# Patient Record
Sex: FEMALE | Race: WHITE | NOT HISPANIC OR LATINO | Employment: PART TIME | URBAN - METROPOLITAN AREA
[De-identification: names, ages, dates, MRNs, and addresses within clinical notes are randomized per-mention and may not be internally consistent; named-entity substitution may affect disease eponyms.]

---

## 2019-07-17 ENCOUNTER — HOSPITAL ENCOUNTER (OUTPATIENT)
Dept: OTHER | Facility: HOSPITAL | Age: 39
Discharge: HOME | End: 2019-07-17

## 2019-07-17 DIAGNOSIS — Z00.6 EXAMINATION FOR NORMAL COMPARISON OR CONTROL IN CLINICAL RESEARCH: ICD-10-CM

## 2019-07-24 ENCOUNTER — HOSPITAL ENCOUNTER (OUTPATIENT)
Dept: ULTRASOUND IMAGING | Facility: HOSPITAL | Age: 39
Discharge: HOME | End: 2019-07-24
Admitting: INTERNAL MEDICINE

## 2019-07-24 DIAGNOSIS — E04.1 THYROID NODULE: ICD-10-CM

## 2019-07-24 PROCEDURE — 88305 TISSUE EXAM BY PATHOLOGIST: CPT | Performed by: INTERNAL MEDICINE

## 2019-07-24 PROCEDURE — 25010000003 LIDOCAINE 1 % SOLUTION: Performed by: INTERNAL MEDICINE

## 2019-07-24 PROCEDURE — 88173 CYTOPATH EVAL FNA REPORT: CPT | Performed by: INTERNAL MEDICINE

## 2019-07-24 RX ORDER — LIDOCAINE HYDROCHLORIDE 10 MG/ML
5 INJECTION, SOLUTION INFILTRATION; PERINEURAL ONCE
Status: COMPLETED | OUTPATIENT
Start: 2019-07-24 | End: 2019-07-24

## 2019-07-24 RX ADMIN — LIDOCAINE HYDROCHLORIDE 5 ML: 10 INJECTION, SOLUTION INFILTRATION; PERINEURAL at 11:45

## 2019-07-26 LAB
LAB AP CASE REPORT: NORMAL
PATH REPORT.ADDENDUM SPEC: NORMAL
PATH REPORT.FINAL DX SPEC: NORMAL
PATH REPORT.GROSS SPEC: NORMAL

## 2021-05-24 ENCOUNTER — OFFICE VISIT (OUTPATIENT)
Dept: FAMILY MEDICINE CLINIC | Facility: CLINIC | Age: 41
End: 2021-05-24

## 2021-05-24 VITALS
BODY MASS INDEX: 30.55 KG/M2 | RESPIRATION RATE: 18 BRPM | WEIGHT: 172.4 LBS | DIASTOLIC BLOOD PRESSURE: 90 MMHG | HEART RATE: 108 BPM | HEIGHT: 63 IN | TEMPERATURE: 97.9 F | SYSTOLIC BLOOD PRESSURE: 152 MMHG | OXYGEN SATURATION: 97 %

## 2021-05-24 DIAGNOSIS — R53.83 MALAISE AND FATIGUE: ICD-10-CM

## 2021-05-24 DIAGNOSIS — E11.43 TYPE 2 DIABETES MELLITUS WITH DIABETIC AUTONOMIC NEUROPATHY, WITHOUT LONG-TERM CURRENT USE OF INSULIN (HCC): Primary | ICD-10-CM

## 2021-05-24 DIAGNOSIS — E89.0 POSTABLATIVE HYPOTHYROIDISM: ICD-10-CM

## 2021-05-24 DIAGNOSIS — R53.81 MALAISE AND FATIGUE: ICD-10-CM

## 2021-05-24 DIAGNOSIS — I10 ESSENTIAL HYPERTENSION: ICD-10-CM

## 2021-05-24 PROBLEM — E07.9 THYROID DISORDER: Status: ACTIVE | Noted: 2019-04-30

## 2021-05-24 PROCEDURE — 99204 OFFICE O/P NEW MOD 45 MIN: CPT | Performed by: FAMILY MEDICINE

## 2021-05-24 RX ORDER — AMLODIPINE BESYLATE 5 MG/1
5 TABLET ORAL DAILY
COMMUNITY
Start: 2021-05-05

## 2021-05-24 RX ORDER — METFORMIN HYDROCHLORIDE 500 MG/1
TABLET, EXTENDED RELEASE ORAL
COMMUNITY
Start: 2021-05-22

## 2021-05-24 RX ORDER — HYDROCHLOROTHIAZIDE 12.5 MG/1
TABLET ORAL
COMMUNITY
Start: 2021-05-15

## 2021-05-24 RX ORDER — LEVOTHYROXINE SODIUM 100 UG/1
TABLET ORAL
COMMUNITY
Start: 2021-05-15

## 2021-05-24 RX ORDER — PROGESTERONE 100 MG/1
100 INSERT VAGINAL DAILY
Qty: 21 EACH | Refills: 4 | Status: SHIPPED | OUTPATIENT
Start: 2021-05-24 | End: 2021-05-26 | Stop reason: SDUPTHER

## 2021-05-24 NOTE — PROGRESS NOTES
Subjective   Debbie Deluca is a 40 y.o. female.   Chief Complaint   Patient presents with   • Hypertension     Hypertension  This is a chronic problem. The current episode started more than 1 year ago. The problem is controlled. Pertinent negatives include no chest pain or shortness of breath. Risk factors for coronary artery disease include diabetes mellitus. There are no compliance problems.         The following portions of the patient's history were reviewed and updated as appropriate: allergies, current medications, past family history, past medical history, past social history, past surgical history and problem list.    Past Medical History:   Diagnosis Date   • Diabetes mellitus (CMS/HCC)    • Hypertension    • Hypothyroidism        Past Surgical History:   Procedure Laterality Date   • THYROIDECTOMY  2018   • US GUIDED FINE NEEDLE ASPIRATION  7/24/2019        Family History   Problem Relation Age of Onset   • Cancer Mother         dx at age late 68, living at 70   • Miscarriages / Stillbirths Mother    • Other Sister         Downd Syndrom   • Other Sister         MS-dx at age 43        Social History     Socioeconomic History   • Marital status:      Spouse name: Not on file   • Number of children: Not on file   • Years of education: Not on file   • Highest education level: Not on file   Tobacco Use   • Smoking status: Never Smoker   • Smokeless tobacco: Never Used   Substance and Sexual Activity   • Alcohol use: Never   • Drug use: Never   • Sexual activity: Yes     Partners: Male     Birth control/protection: None         Review of Systems   Constitutional: Negative for activity change, appetite change and fatigue.   HENT: Negative for ear discharge, ear pain, postnasal drip and rhinorrhea.    Eyes: Negative for double vision and discharge.   Respiratory: Negative for cough, chest tightness and shortness of breath.    Cardiovascular: Negative for chest pain.   Endocrine: Negative for cold  "intolerance and heat intolerance.   Musculoskeletal: Negative for back pain, gait problem and joint swelling.   Skin: Negative for color change and rash.   Neurological: Negative for dizziness, facial asymmetry and confusion.   Psychiatric/Behavioral: Negative for behavioral problems.       Objective   Visit Vitals  /90 (BP Location: Left arm, Patient Position: Sitting, Cuff Size: Adult)   Pulse 108   Temp 97.9 °F (36.6 °C) (Temporal)   Resp 18   Ht 160 cm (63\")   Wt 78.2 kg (172 lb 6.4 oz)   SpO2 97%   BMI 30.54 kg/m²     Physical Exam  Vitals reviewed.   Constitutional:       Appearance: She is well-developed.   HENT:      Head: Normocephalic.      Right Ear: External ear normal.      Left Ear: External ear normal.      Nose: Nose normal.   Eyes:      Conjunctiva/sclera: Conjunctivae normal.   Cardiovascular:      Rate and Rhythm: Normal rate and regular rhythm.   Pulmonary:      Effort: Pulmonary effort is normal.      Breath sounds: Normal breath sounds.   Musculoskeletal:         General: Normal range of motion.      Cervical back: Normal range of motion and neck supple.   Skin:     General: Skin is warm and dry.      Capillary Refill: Capillary refill takes less than 2 seconds.   Neurological:      Mental Status: She is alert and oriented to person, place, and time.         Assessment/Plan   Problem List Items Addressed This Visit        Cardiac and Vasculature    Essential hypertension    Current Assessment & Plan     Hypertension is worsening.  Continue current treatment regimen.  Dietary sodium restriction.  Weight loss.  Regular aerobic exercise.  Blood pressure will be reassessed in 3 months.         Relevant Medications    amLODIPine (NORVASC) 5 MG tablet    hydroCHLOROthiazide (HYDRODIURIL) 12.5 MG tablet    Other Relevant Orders    Comprehensive Metabolic Panel (Completed)    Lipid Panel With / Chol / HDL Ratio (Completed)    CBC & Differential (Completed)       Endocrine and Metabolic    Type 2 " diabetes mellitus with diabetic autonomic neuropathy, without long-term current use of insulin (CMS/[facility]) - Primary    Current Assessment & Plan     Diabetes is improving with treatment.   Continue current treatment regimen.  Diabetes will be reassessed in 3 months.         Relevant Medications    metFORMIN ER (GLUCOPHAGE-XR) 500 MG 24 hr tablet    Other Relevant Orders    Hemoglobin A1c (Completed)    Postablative hypothyroidism    Current Assessment & Plan     Blood work obtained.  Will continue to monitor symptoms         Relevant Medications    Euthyrox 100 MCG tablet    Other Relevant Orders    TSH (Completed)    T3 (Completed)    T3, Free (Completed)    T4 (Completed)    T4, Free (Completed)      Other Visit Diagnoses     Malaise and fatigue        Relevant Orders    Vitamin D 25 Hydroxy (Completed)    Vitamin B12 (Completed)

## 2021-05-25 LAB
25(OH)D3+25(OH)D2 SERPL-MCNC: 42.4 NG/ML (ref 30–100)
ALBUMIN SERPL-MCNC: 4.9 G/DL (ref 3.8–4.8)
ALBUMIN/GLOB SERPL: 1.5 {RATIO} (ref 1.2–2.2)
ALP SERPL-CCNC: 58 IU/L (ref 48–121)
ALT SERPL-CCNC: 115 IU/L (ref 0–32)
AST SERPL-CCNC: 59 IU/L (ref 0–40)
BASOPHILS # BLD AUTO: 0.1 X10E3/UL (ref 0–0.2)
BASOPHILS NFR BLD AUTO: 1 %
BILIRUB SERPL-MCNC: 0.6 MG/DL (ref 0–1.2)
BUN SERPL-MCNC: 12 MG/DL (ref 6–20)
BUN/CREAT SERPL: 15 (ref 9–23)
CALCIUM SERPL-MCNC: 9.8 MG/DL (ref 8.7–10.2)
CHLORIDE SERPL-SCNC: 96 MMOL/L (ref 96–106)
CHOLEST SERPL-MCNC: 236 MG/DL (ref 100–199)
CHOLEST/HDLC SERPL: 5.6 RATIO (ref 0–4.4)
CO2 SERPL-SCNC: 25 MMOL/L (ref 20–29)
CREAT SERPL-MCNC: 0.81 MG/DL (ref 0.57–1)
EOSINOPHIL # BLD AUTO: 0.1 X10E3/UL (ref 0–0.4)
EOSINOPHIL NFR BLD AUTO: 1 %
ERYTHROCYTE [DISTWIDTH] IN BLOOD BY AUTOMATED COUNT: 12.8 % (ref 11.7–15.4)
GLOBULIN SER CALC-MCNC: 3.3 G/DL (ref 1.5–4.5)
GLUCOSE SERPL-MCNC: 85 MG/DL (ref 65–99)
HBA1C MFR BLD: 5.6 % (ref 4.8–5.6)
HCT VFR BLD AUTO: 44.3 % (ref 34–46.6)
HDLC SERPL-MCNC: 42 MG/DL
HGB BLD-MCNC: 14.6 G/DL (ref 11.1–15.9)
IMM GRANULOCYTES # BLD AUTO: 0 X10E3/UL (ref 0–0.1)
IMM GRANULOCYTES NFR BLD AUTO: 0 %
LDLC SERPL CALC-MCNC: 154 MG/DL (ref 0–99)
LYMPHOCYTES # BLD AUTO: 2.1 X10E3/UL (ref 0.7–3.1)
LYMPHOCYTES NFR BLD AUTO: 21 %
MCH RBC QN AUTO: 29.3 PG (ref 26.6–33)
MCHC RBC AUTO-ENTMCNC: 33 G/DL (ref 31.5–35.7)
MCV RBC AUTO: 89 FL (ref 79–97)
MONOCYTES # BLD AUTO: 0.6 X10E3/UL (ref 0.1–0.9)
MONOCYTES NFR BLD AUTO: 7 %
NEUTROPHILS # BLD AUTO: 6.9 X10E3/UL (ref 1.4–7)
NEUTROPHILS NFR BLD AUTO: 70 %
PLATELET # BLD AUTO: 393 X10E3/UL (ref 150–450)
POTASSIUM SERPL-SCNC: 4.3 MMOL/L (ref 3.5–5.2)
PROT SERPL-MCNC: 8.2 G/DL (ref 6–8.5)
RBC # BLD AUTO: 4.99 X10E6/UL (ref 3.77–5.28)
SODIUM SERPL-SCNC: 139 MMOL/L (ref 134–144)
T3 SERPL-MCNC: 104 NG/DL (ref 71–180)
T3FREE SERPL-MCNC: 3 PG/ML (ref 2–4.4)
T4 FREE SERPL-MCNC: 1.86 NG/DL (ref 0.82–1.77)
T4 SERPL-MCNC: 11.3 UG/DL (ref 4.5–12)
TRIGL SERPL-MCNC: 217 MG/DL (ref 0–149)
TSH SERPL DL<=0.005 MIU/L-ACNC: 2.05 UIU/ML (ref 0.45–4.5)
VIT B12 SERPL-MCNC: 442 PG/ML (ref 232–1245)
VLDLC SERPL CALC-MCNC: 40 MG/DL (ref 5–40)
WBC # BLD AUTO: 9.9 X10E3/UL (ref 3.4–10.8)

## 2021-05-26 RX ORDER — PROGESTERONE 100 MG/1
100 INSERT VAGINAL DAILY
Qty: 21 EACH | Refills: 4 | Status: SHIPPED | OUTPATIENT
Start: 2021-05-26 | End: 2021-06-07

## 2021-05-26 NOTE — TELEPHONE ENCOUNTER
Received a Note from pharmacy that this was [facility] ( as it was [facility] to [facility] as patient wanted it but it was $328.03 ) but due to their insurance it is needing to be sent there.   Refill request being sent to PCP [facility]ill [facility]

## 2021-05-27 PROBLEM — E89.0 POSTABLATIVE HYPOTHYROIDISM: Status: ACTIVE | Noted: 2021-05-27

## 2021-05-27 PROBLEM — E11.43 TYPE 2 DIABETES MELLITUS WITH DIABETIC AUTONOMIC NEUROPATHY, WITHOUT LONG-TERM CURRENT USE OF INSULIN (HCC): Status: ACTIVE | Noted: 2021-05-27

## 2021-06-07 ENCOUNTER — OFFICE VISIT (OUTPATIENT)
Dept: FAMILY MEDICINE CLINIC | Facility: CLINIC | Age: 41
End: 2021-06-07

## 2021-06-07 VITALS
DIASTOLIC BLOOD PRESSURE: 91 MMHG | TEMPERATURE: 97.5 F | SYSTOLIC BLOOD PRESSURE: 159 MMHG | RESPIRATION RATE: 18 BRPM | HEART RATE: 116 BPM | OXYGEN SATURATION: 98 %

## 2021-06-07 DIAGNOSIS — N92.6 ABNORMAL MENSTRUAL CYCLE: ICD-10-CM

## 2021-06-07 DIAGNOSIS — E07.9 THYROID DISORDER: ICD-10-CM

## 2021-06-07 DIAGNOSIS — E78.2 MIXED HYPERLIPIDEMIA: Primary | ICD-10-CM

## 2021-06-07 PROCEDURE — 99214 OFFICE O/P EST MOD 30 MIN: CPT | Performed by: FAMILY MEDICINE

## 2021-06-07 RX ORDER — PROGESTERONE 200 MG/1
200 CAPSULE ORAL DAILY
Qty: 20 CAPSULE | Refills: 3 | Status: SHIPPED | OUTPATIENT
Start: 2021-06-07

## 2021-06-07 NOTE — PROGRESS NOTES
Chief Complaint  Labs Only and Hyperlipidemia    Subjective    History of Present Illness        Debbie Deluca presents to [facility] for   Hyperlipidemia  This is a recurrent problem. The current episode started more than 1 year ago. The problem is uncontrolled. Recent lipid tests were reviewed and are high. Exacerbating diseases include hypothyroidism and obesity. She has no history of chronic renal disease, diabetes, liver disease or nephrotic syndrome. There are no known factors aggravating her hyperlipidemia. Pertinent negatives include no chest pain, focal sensory loss, focal weakness, leg pain, myalgias or shortness of breath. Current antihyperlipidemic treatment includes statins. The current treatment provides mild improvement of lipids. Risk factors for coronary artery disease include family history, hypertension, obesity, stress and diabetes mellitus.   Hypothyroidism  This is a chronic problem. The current episode started more than 1 year ago. The problem occurs daily. The problem has been gradually improving. Pertinent negatives include no abdominal pain, anorexia, arthralgias, change in bowel habit, chest pain, chills, congestion, coughing, diaphoresis, fatigue, fever, headaches, joint swelling, myalgias, nausea, neck pain, numbness, rash, sore throat, swollen glands, urinary symptoms, vertigo, visual change, vomiting or weakness. Nothing aggravates the symptoms. Treatments tried: She is on Eurthrox to treat  The treatment provided no relief.      Lab review:   The patient is here today and going over the labs that she recently had done. Her A1C was 5.6. her CMP had some abnormal values in it with her Albumin 4.9. Her Ast was 59 and her ALT was 115. Her cholesterol panel was elevated with her total cholesterol being 236, triglycerides was 217, LDL chol calc was 154, Chol/HDL ratio was 5.6. her TSH was normal at 2.050. Her T3 free and total was normal with her total  being 104 and her T3 free bring 3.0. Her T4 total was 11.3 and her T4 free was 1.86 . Her vitamin D was 42.4 and her Vitamin B-12 was 442.   Objective   Vital Signs:   Visit Vitals  /91   Pulse 116   Temp 97.5 °F (36.4 °C)   Resp 18   SpO2 98%       Physical Exam  Vitals reviewed.   Constitutional:       Appearance: She is well-developed.   HENT:      Head: Normocephalic.      Right Ear: External ear normal.      Left Ear: External ear normal.      Nose: Nose normal.   Eyes:      Conjunctiva/sclera: Conjunctivae normal.   Cardiovascular:      Rate and Rhythm: Normal rate and regular rhythm.   Pulmonary:      Effort: Pulmonary effort is normal.      Breath sounds: Normal breath sounds.   Musculoskeletal:         General: Normal range of motion.      Cervical back: Normal range of motion and neck supple.   Skin:     General: Skin is warm and dry.      Capillary Refill: Capillary refill takes less than 2 seconds.   Neurological:      Mental Status: She is alert and oriented to person, place, and time.            Result Review :                  Recent Results (from the past 1344 hour(s))   Comprehensive Metabolic Panel    Collection Time: 05/24/21  3:54 PM    Specimen: Blood   Result Value Ref Range    Glucose 85 65 - 99 mg/dL    BUN 12 6 - 20 mg/dL    Creatinine 0.81 0.57 - 1.00 mg/dL    eGFR Non African Am 92 >59 mL/min/1.73    eGFR African Am 106 >59 mL/min/1.73    BUN/Creatinine Ratio 15 9 - 23    Sodium 139 134 - 144 mmol/L    Potassium 4.3 3.5 - 5.2 mmol/L    Chloride 96 96 - 106 mmol/L    Total CO2 25 20 - 29 mmol/L    Calcium 9.8 8.7 - 10.2 mg/dL    Total Protein 8.2 6.0 - 8.5 g/dL    Albumin 4.9 (H) 3.8 - 4.8 g/dL    Globulin 3.3 1.5 - 4.5 g/dL    A/G Ratio 1.5 1.2 - 2.2    Total Bilirubin 0.6 0.0 - 1.2 mg/dL    Alkaline Phosphatase 58 48 - 121 IU/L    AST (SGOT) 59 (H) 0 - 40 IU/L    ALT (SGPT) 115 (H) 0 - 32 IU/L   Lipid Panel With / Chol / HDL Ratio    Collection Time: 05/24/21  3:54 PM    Specimen:  Blood   Result Value Ref Range    Total Cholesterol 236 (H) 100 - 199 mg/dL    Triglycerides 217 (H) 0 - 149 mg/dL    HDL Cholesterol 42 >39 mg/dL    VLDL Cholesterol Luis E 40 5 - 40 mg/dL    LDL Chol Calc (NIH) 154 (H) 0 - 99 mg/dL    Chol/HDL Ratio 5.6 (H) 0.0 - 4.4 ratio   TSH    Collection Time: 05/24/21  3:54 PM    Specimen: Blood   Result Value Ref Range    TSH 2.050 0.450 - 4.500 uIU/mL   CBC & Differential    Collection Time: 05/24/21  3:54 PM    Specimen: Blood   Result Value Ref Range    WBC 9.9 3.4 - 10.8 x10E3/uL    RBC 4.99 3.77 - 5.28 x10E6/uL    Hemoglobin 14.6 11.1 - 15.9 g/dL    Hematocrit 44.3 34.0 - 46.6 %    MCV 89 79 - 97 fL    MCH 29.3 26.6 - 33.0 pg    MCHC 33.0 31.5 - 35.7 g/dL    RDW 12.8 11.7 - 15.4 %    Platelets 393 150 - 450 x10E3/uL    Neutrophil Rel % 70 Not Estab. %    Lymphocyte Rel % 21 Not Estab. %    Monocyte Rel % 7 Not Estab. %    Eosinophil Rel % 1 Not Estab. %    Basophil Rel % 1 Not Estab. %    Neutrophils Absolute 6.9 1.4 - 7.0 x10E3/uL    Lymphocytes Absolute 2.1 0.7 - 3.1 x10E3/uL    Monocytes Absolute 0.6 0.1 - 0.9 x10E3/uL    Eosinophils Absolute 0.1 0.0 - 0.4 x10E3/uL    Basophils Absolute 0.1 0.0 - 0.2 x10E3/uL    Immature Granulocyte Rel % 0 Not Estab. %    Immature Grans Absolute 0.0 0.0 - 0.1 x10E3/uL   T3    Collection Time: 05/24/21  3:54 PM    Specimen: Blood   Result Value Ref Range    T3, Total 104 71 - 180 ng/dL   T3, Free    Collection Time: 05/24/21  3:54 PM    Specimen: Blood   Result Value Ref Range    T3, Free 3.0 2.0 - 4.4 pg/mL   T4    Collection Time: 05/24/21  3:54 PM    Specimen: Blood   Result Value Ref Range    T4, Total 11.3 4.5 - 12.0 ug/dL   T4, Free    Collection Time: 05/24/21  3:54 PM    Specimen: Blood   Result Value Ref Range    Free T4 1.86 (H) 0.82 - 1.77 ng/dL   Vitamin D 25 Hydroxy    Collection Time: 05/24/21  3:54 PM    Specimen: Blood   Result Value Ref Range    25 Hydroxy, Vitamin D 42.4 30.0 - 100.0 ng/mL   Vitamin B12    Collection  Time: 05/24/21  3:54 PM    Specimen: Blood   Result Value Ref Range    Vitamin B-12 442 232 - 1,245 pg/mL   Hemoglobin A1c    Collection Time: 05/24/21  3:54 PM    Specimen: Blood   Result Value Ref Range    Hemoglobin A1C 5.6 4.8 - 5.6 %     Assessment and Plan      Diagnoses and all orders for this visit:    1. Mixed hyperlipidemia (Primary)  Assessment & Plan:  Lipid abnormalities are unchanged.  Nutritional counseling was provided.  Lipids will be reassessed in 6 months.      2. Thyroid disorder  Assessment & Plan:  Patient's labs are stable no changes to medication.  Patient return to clinic in 4 to 6 months for follow-up.      3. Abnormal menstrual cycle  Assessment & Plan:  Patient was prescribed progesterone to treat her symptoms.  Patient was encouraged return to clinic in 4 to 6 months.    Orders:  -     Progesterone (PROMETRIUM) 200 MG capsule; Take 1 capsule by mouth Daily.  Dispense: 20 capsule; Refill: 3           Follow Up   No follow-ups on file.  Patient was given instructions and counseling regarding her condition or for health maintenance advice. Please see specific information pulled into the AVS if appropriate.

## 2021-06-10 PROBLEM — E78.2 MIXED HYPERLIPIDEMIA: Status: ACTIVE | Noted: 2021-06-10

## 2021-06-10 PROBLEM — N92.6 ABNORMAL MENSTRUAL CYCLE: Status: ACTIVE | Noted: 2021-06-10

## 2021-06-10 NOTE — ASSESSMENT & PLAN NOTE
Patient's labs are stable no changes to medication.  Patient return to clinic in 4 to 6 months for follow-up.

## 2021-06-10 NOTE — ASSESSMENT & PLAN NOTE
Patient was prescribed progesterone to treat her symptoms.  Patient was encouraged return to clinic in 4 to 6 months.

## 2021-10-04 DIAGNOSIS — R53.83 MALAISE AND FATIGUE: ICD-10-CM

## 2021-10-04 DIAGNOSIS — E07.9 THYROID DISORDER: ICD-10-CM

## 2021-10-04 DIAGNOSIS — E78.2 MIXED HYPERLIPIDEMIA: Primary | ICD-10-CM

## 2021-10-04 DIAGNOSIS — Z11.59 NEED FOR HEPATITIS C SCREENING TEST: ICD-10-CM

## 2021-10-04 DIAGNOSIS — I10 ESSENTIAL HYPERTENSION: ICD-10-CM

## 2021-10-04 DIAGNOSIS — R53.81 MALAISE AND FATIGUE: ICD-10-CM

## 2021-10-04 DIAGNOSIS — E11.43 TYPE 2 DIABETES MELLITUS WITH DIABETIC AUTONOMIC NEUROPATHY, WITHOUT LONG-TERM CURRENT USE OF INSULIN (HCC): ICD-10-CM

## 2021-10-05 LAB
25(OH)D3+25(OH)D2 SERPL-MCNC: 45 NG/ML (ref 30–100)
ALBUMIN SERPL-MCNC: 5.1 G/DL (ref 3.8–4.8)
ALBUMIN/GLOB SERPL: 1.6 {RATIO} (ref 1.2–2.2)
ALP SERPL-CCNC: 57 IU/L (ref 44–121)
ALT SERPL-CCNC: 36 IU/L (ref 0–32)
AST SERPL-CCNC: 21 IU/L (ref 0–40)
BASOPHILS # BLD AUTO: 0.1 X10E3/UL (ref 0–0.2)
BASOPHILS NFR BLD AUTO: 1 %
BILIRUB SERPL-MCNC: 0.3 MG/DL (ref 0–1.2)
BUN SERPL-MCNC: 16 MG/DL (ref 6–24)
BUN/CREAT SERPL: 21 (ref 9–23)
CALCIUM SERPL-MCNC: 9.7 MG/DL (ref 8.7–10.2)
CHLORIDE SERPL-SCNC: 98 MMOL/L (ref 96–106)
CHOLEST SERPL-MCNC: 213 MG/DL (ref 100–199)
CHOLEST/HDLC SERPL: 5 RATIO (ref 0–4.4)
CO2 SERPL-SCNC: 25 MMOL/L (ref 20–29)
CREAT SERPL-MCNC: 0.75 MG/DL (ref 0.57–1)
EOSINOPHIL # BLD AUTO: 0.2 X10E3/UL (ref 0–0.4)
EOSINOPHIL NFR BLD AUTO: 2 %
ERYTHROCYTE [DISTWIDTH] IN BLOOD BY AUTOMATED COUNT: 13 % (ref 11.7–15.4)
GLOBULIN SER CALC-MCNC: 3.2 G/DL (ref 1.5–4.5)
GLUCOSE SERPL-MCNC: 103 MG/DL (ref 65–99)
HBA1C MFR BLD: 5.5 % (ref 4.8–5.6)
HCT VFR BLD AUTO: 44.6 % (ref 34–46.6)
HCV AB S/CO SERPL IA: <0.1 S/CO RATIO (ref 0–0.9)
HDLC SERPL-MCNC: 43 MG/DL
HGB BLD-MCNC: 14.8 G/DL (ref 11.1–15.9)
IMM GRANULOCYTES # BLD AUTO: 0 X10E3/UL (ref 0–0.1)
IMM GRANULOCYTES NFR BLD AUTO: 0 %
LDLC SERPL CALC-MCNC: 142 MG/DL (ref 0–99)
LYMPHOCYTES # BLD AUTO: 2.5 X10E3/UL (ref 0.7–3.1)
LYMPHOCYTES NFR BLD AUTO: 26 %
MCH RBC QN AUTO: 28.9 PG (ref 26.6–33)
MCHC RBC AUTO-ENTMCNC: 33.2 G/DL (ref 31.5–35.7)
MCV RBC AUTO: 87 FL (ref 79–97)
MONOCYTES # BLD AUTO: 0.6 X10E3/UL (ref 0.1–0.9)
MONOCYTES NFR BLD AUTO: 6 %
NEUTROPHILS # BLD AUTO: 6.1 X10E3/UL (ref 1.4–7)
NEUTROPHILS NFR BLD AUTO: 65 %
PLATELET # BLD AUTO: 435 X10E3/UL (ref 150–450)
POTASSIUM SERPL-SCNC: 4.3 MMOL/L (ref 3.5–5.2)
PROT SERPL-MCNC: 8.3 G/DL (ref 6–8.5)
RBC # BLD AUTO: 5.12 X10E6/UL (ref 3.77–5.28)
SODIUM SERPL-SCNC: 139 MMOL/L (ref 134–144)
T3 SERPL-MCNC: 107 NG/DL (ref 71–180)
T3FREE SERPL-MCNC: 3 PG/ML (ref 2–4.4)
T4 FREE SERPL-MCNC: 2.04 NG/DL (ref 0.82–1.77)
T4 SERPL-MCNC: 11.5 UG/DL (ref 4.5–12)
TRIGL SERPL-MCNC: 155 MG/DL (ref 0–149)
VIT B12 SERPL-MCNC: 362 PG/ML (ref 232–1245)
VLDLC SERPL CALC-MCNC: 28 MG/DL (ref 5–40)
WBC # BLD AUTO: 9.4 X10E3/UL (ref 3.4–10.8)

## 2021-10-11 ENCOUNTER — OFFICE VISIT (OUTPATIENT)
Dept: FAMILY MEDICINE CLINIC | Facility: CLINIC | Age: 41
End: 2021-10-11

## 2021-10-11 VITALS
HEART RATE: 96 BPM | SYSTOLIC BLOOD PRESSURE: 140 MMHG | HEIGHT: 63 IN | RESPIRATION RATE: 18 BRPM | WEIGHT: 157 LBS | BODY MASS INDEX: 27.82 KG/M2 | OXYGEN SATURATION: 99 % | DIASTOLIC BLOOD PRESSURE: 80 MMHG

## 2021-10-11 DIAGNOSIS — E11.43 TYPE 2 DIABETES MELLITUS WITH DIABETIC AUTONOMIC NEUROPATHY, WITHOUT LONG-TERM CURRENT USE OF INSULIN (HCC): Primary | ICD-10-CM

## 2021-10-11 DIAGNOSIS — E89.0 POSTABLATIVE HYPOTHYROIDISM: ICD-10-CM

## 2021-10-11 DIAGNOSIS — I10 ESSENTIAL HYPERTENSION: ICD-10-CM

## 2021-10-11 DIAGNOSIS — E78.2 MIXED HYPERLIPIDEMIA: ICD-10-CM

## 2021-10-11 PROCEDURE — 99214 OFFICE O/P EST MOD 30 MIN: CPT | Performed by: FAMILY MEDICINE

## 2021-10-11 RX ORDER — ATORVASTATIN CALCIUM 20 MG/1
20 TABLET, FILM COATED ORAL DAILY
Qty: 90 TABLET | Refills: 1 | Status: SHIPPED | OUTPATIENT
Start: 2021-10-11 | End: 2022-03-30

## 2021-10-11 NOTE — PROGRESS NOTES
Chief Complaint  Diabetes, Hyperlipidemia, Hypertension, and Hypothyroidism    Subjective    History of Present Illness        Debbie Deluca presents to [facility] for   Diabetes  She presents for her follow-up diabetic visit. She has type 2 diabetes mellitus. No MedicAlert identification noted. Pertinent negatives for hypoglycemia include no confusion, dizziness, headaches, hunger, mood changes, nervousness/anxiousness, pallor, seizures, sleepiness, speech difficulty, sweats or tremors. Associated symptoms include weight loss (intended weight loss ). Pertinent negatives for diabetes include no blurred vision, no chest pain, no fatigue, no foot paresthesias, no foot ulcerations, no polydipsia, no polyphagia, no polyuria, no visual change and no weakness. There are no hypoglycemic complications. Symptoms are stable. There are no diabetic complications. Risk factors for coronary artery disease include family history, hypertension, obesity, stress and diabetes mellitus. Current diabetic treatment includes oral agent (monotherapy) (Metformin to treat ). She participates in exercise intermittently. (Patient states that she does not check her sugars at home as she states that she is insulin resistant and she states that she is very well controlled   Her A1C is 5.5  ) An ACE inhibitor/angiotensin II receptor blocker is being taken. She does not see a podiatrist.Eye exam is current.   Hyperlipidemia  This is a chronic problem. The current episode started more than 1 year ago. The problem is controlled. Recent lipid tests were reviewed and are variable. Exacerbating diseases include diabetes and hypothyroidism. She has no history of chronic renal disease, liver disease, obesity or nephrotic syndrome. There are no known factors aggravating her hyperlipidemia. Pertinent negatives include no chest pain or myalgias. She is currently on no antihyperlipidemic treatment. Risk factors for coronary  "artery disease include hypertension.   Hypertension  This is a chronic problem. The current episode started more than 1 year ago. The problem is unchanged. Pertinent negatives include no blurred vision, chest pain, headaches, neck pain or sweats. There are no associated agents to hypertension. Current antihypertension treatment includes beta blockers. There are no compliance problems.  Identifiable causes of hypertension include a thyroid problem. There is no history of chronic renal disease.   Hypothyroidism  This is a chronic problem. The current episode started more than 1 year ago. The problem occurs constantly. Pertinent negatives include no abdominal pain, anorexia, arthralgias, change in bowel habit, chest pain, chills, congestion, coughing, diaphoresis, fatigue, fever, headaches, joint swelling, myalgias, nausea, neck pain, numbness, rash, sore throat, swollen glands, urinary symptoms, vertigo, visual change, vomiting or weakness. Nothing aggravates the symptoms. Treatments tried: Euthyrox 100mg. The treatment provided moderate relief.        Objective   Vital Signs:   Visit Vitals  /80   Pulse 96   Resp 18   Ht 160 cm (63\")   Wt 71.2 kg (157 lb)   SpO2 99%   BMI 27.81 kg/m²       Physical Exam  Vitals reviewed.   Constitutional:       Appearance: She is well-developed.   HENT:      Head: Normocephalic.      Right Ear: External ear normal.      Left Ear: External ear normal.      Nose: Nose normal.   Eyes:      Conjunctiva/sclera: Conjunctivae normal.   Cardiovascular:      Rate and Rhythm: Normal rate and regular rhythm.   Pulmonary:      Effort: Pulmonary effort is normal.      Breath sounds: Normal breath sounds.   Musculoskeletal:         General: Normal range of motion.      Cervical back: Normal range of motion and neck supple.   Skin:     General: Skin is warm and dry.      Capillary Refill: Capillary refill takes less than 2 seconds.   Neurological:      Mental Status: She is alert and oriented " to person, place, and time.            Result Review :                  Recent Results (from the past 32231 hour(s))   Comprehensive Metabolic Panel    Collection Time: 05/24/21  3:54 PM    Specimen: Blood   Result Value Ref Range    Glucose 85 65 - 99 mg/dL    BUN 12 6 - 20 mg/dL    Creatinine 0.81 0.57 - 1.00 mg/dL    eGFR Non African Am 92 >59 mL/min/1.73    eGFR African Am 106 >59 mL/min/1.73    BUN/Creatinine Ratio 15 9 - 23    Sodium 139 134 - 144 mmol/L    Potassium 4.3 3.5 - 5.2 mmol/L    Chloride 96 96 - 106 mmol/L    Total CO2 25 20 - 29 mmol/L    Calcium 9.8 8.7 - 10.2 mg/dL    Total Protein 8.2 6.0 - 8.5 g/dL    Albumin 4.9 (H) 3.8 - 4.8 g/dL    Globulin 3.3 1.5 - 4.5 g/dL    A/G Ratio 1.5 1.2 - 2.2    Total Bilirubin 0.6 0.0 - 1.2 mg/dL    Alkaline Phosphatase 58 48 - 121 IU/L    AST (SGOT) 59 (H) 0 - 40 IU/L    ALT (SGPT) 115 (H) 0 - 32 IU/L   Lipid Panel With / Chol / HDL Ratio    Collection Time: 05/24/21  3:54 PM    Specimen: Blood   Result Value Ref Range    Total Cholesterol 236 (H) 100 - 199 mg/dL    Triglycerides 217 (H) 0 - 149 mg/dL    HDL Cholesterol 42 >39 mg/dL    VLDL Cholesterol Luis E 40 5 - 40 mg/dL    LDL Chol Calc (NIH) 154 (H) 0 - 99 mg/dL    Chol/HDL Ratio 5.6 (H) 0.0 - 4.4 ratio   TSH    Collection Time: 05/24/21  3:54 PM    Specimen: Blood   Result Value Ref Range    TSH 2.050 0.450 - 4.500 uIU/mL   CBC & Differential    Collection Time: 05/24/21  3:54 PM    Specimen: Blood   Result Value Ref Range    WBC 9.9 3.4 - 10.8 x10E3/uL    RBC 4.99 3.77 - 5.28 x10E6/uL    Hemoglobin 14.6 11.1 - 15.9 g/dL    Hematocrit 44.3 34.0 - 46.6 %    MCV 89 79 - 97 fL    MCH 29.3 26.6 - 33.0 pg    MCHC 33.0 31.5 - 35.7 g/dL    RDW 12.8 11.7 - 15.4 %    Platelets 393 150 - 450 x10E3/uL    Neutrophil Rel % 70 Not Estab. %    Lymphocyte Rel % 21 Not Estab. %    Monocyte Rel % 7 Not Estab. %    Eosinophil Rel % 1 Not Estab. %    Basophil Rel % 1 Not Estab. %    Neutrophils Absolute 6.9 1.4 - 7.0 x10E3/uL     Lymphocytes Absolute 2.1 0.7 - 3.1 x10E3/uL    Monocytes Absolute 0.6 0.1 - 0.9 x10E3/uL    Eosinophils Absolute 0.1 0.0 - 0.4 x10E3/uL    Basophils Absolute 0.1 0.0 - 0.2 x10E3/uL    Immature Granulocyte Rel % 0 Not Estab. %    Immature Grans Absolute 0.0 0.0 - 0.1 x10E3/uL   T3    Collection Time: 05/24/21  3:54 PM    Specimen: Blood   Result Value Ref Range    T3, Total 104 71 - 180 ng/dL   T3, Free    Collection Time: 05/24/21  3:54 PM    Specimen: Blood   Result Value Ref Range    T3, Free 3.0 2.0 - 4.4 pg/mL   T4    Collection Time: 05/24/21  3:54 PM    Specimen: Blood   Result Value Ref Range    T4, Total 11.3 4.5 - 12.0 ug/dL   T4, Free    Collection Time: 05/24/21  3:54 PM    Specimen: Blood   Result Value Ref Range    Free T4 1.86 (H) 0.82 - 1.77 ng/dL   Vitamin D 25 Hydroxy    Collection Time: 05/24/21  3:54 PM    Specimen: Blood   Result Value Ref Range    25 Hydroxy, Vitamin D 42.4 30.0 - 100.0 ng/mL   Vitamin B12    Collection Time: 05/24/21  3:54 PM    Specimen: Blood   Result Value Ref Range    Vitamin B-12 442 232 - 1,245 pg/mL   Hemoglobin A1c    Collection Time: 05/24/21  3:54 PM    Specimen: Blood   Result Value Ref Range    Hemoglobin A1C 5.6 4.8 - 5.6 %   CBC & Differential    Collection Time: 10/04/21  9:00 AM   Result Value Ref Range    WBC 9.4 3.4 - 10.8 x10E3/uL    RBC 5.12 3.77 - 5.28 x10E6/uL    Hemoglobin 14.8 11.1 - 15.9 g/dL    Hematocrit 44.6 34.0 - 46.6 %    MCV 87 79 - 97 fL    MCH 28.9 26.6 - 33.0 pg    MCHC 33.2 31.5 - 35.7 g/dL    RDW 13.0 11.7 - 15.4 %    Platelets 435 150 - 450 x10E3/uL    Neutrophil Rel % 65 Not Estab. %    Lymphocyte Rel % 26 Not Estab. %    Monocyte Rel % 6 Not Estab. %    Eosinophil Rel % 2 Not Estab. %    Basophil Rel % 1 Not Estab. %    Neutrophils Absolute 6.1 1.4 - 7.0 x10E3/uL    Lymphocytes Absolute 2.5 0.7 - 3.1 x10E3/uL    Monocytes Absolute 0.6 0.1 - 0.9 x10E3/uL    Eosinophils Absolute 0.2 0.0 - 0.4 x10E3/uL    Basophils Absolute 0.1 0.0 - 0.2  x10E3/uL    Immature Granulocyte Rel % 0 Not Estab. %    Immature Grans Absolute 0.0 0.0 - 0.1 x10E3/uL   Comprehensive Metabolic Panel    Collection Time: 10/04/21  9:00 AM   Result Value Ref Range    Glucose 103 (H) 65 - 99 mg/dL    BUN 16 6 - 24 mg/dL    Creatinine 0.75 0.57 - 1.00 mg/dL    eGFR Non African Am 100 >59 mL/min/1.73    eGFR African Am 115 >59 mL/min/1.73    BUN/Creatinine Ratio 21 9 - 23    Sodium 139 134 - 144 mmol/L    Potassium 4.3 3.5 - 5.2 mmol/L    Chloride 98 96 - 106 mmol/L    Total CO2 25 20 - 29 mmol/L    Calcium 9.7 8.7 - 10.2 mg/dL    Total Protein 8.3 6.0 - 8.5 g/dL    Albumin 5.1 (H) 3.8 - 4.8 g/dL    Globulin 3.2 1.5 - 4.5 g/dL    A/G Ratio 1.6 1.2 - 2.2    Total Bilirubin 0.3 0.0 - 1.2 mg/dL    Alkaline Phosphatase 57 44 - 121 IU/L    AST (SGOT) 21 0 - 40 IU/L    ALT (SGPT) 36 (H) 0 - 32 IU/L   Lipid Panel With / Chol / HDL Ratio    Collection Time: 10/04/21  9:00 AM   Result Value Ref Range    Total Cholesterol 213 (H) 100 - 199 mg/dL    Triglycerides 155 (H) 0 - 149 mg/dL    HDL Cholesterol 43 >39 mg/dL    VLDL Cholesterol Luis E 28 5 - 40 mg/dL    LDL Chol Calc (NIH) 142 (H) 0 - 99 mg/dL    Chol/HDL Ratio 5.0 (H) 0.0 - 4.4 ratio   Hemoglobin A1c    Collection Time: 10/04/21  9:00 AM   Result Value Ref Range    Hemoglobin A1C 5.5 4.8 - 5.6 %   T4, Free    Collection Time: 10/04/21  9:00 AM   Result Value Ref Range    Free T4 2.04 (H) 0.82 - 1.77 ng/dL   Vitamin D 25 Hydroxy    Collection Time: 10/04/21  9:00 AM   Result Value Ref Range    25 Hydroxy, Vitamin D 45.0 30.0 - 100.0 ng/mL   T4    Collection Time: 10/04/21  9:00 AM   Result Value Ref Range    T4, Total 11.5 4.5 - 12.0 ug/dL   T3    Collection Time: 10/04/21  9:00 AM   Result Value Ref Range    T3, Total 107 71 - 180 ng/dL   Vitamin B12    Collection Time: 10/04/21  9:00 AM   Result Value Ref Range    Vitamin B-12 362 232 - 1,245 pg/mL   T3, Free    Collection Time: 10/04/21  9:00 AM   Result Value Ref Range    T3, Free 3.0  2.0 - 4.4 pg/mL   Hepatitis C Antibody    Collection Time: 10/04/21  9:00 AM   Result Value Ref Range    Hep C Virus Ab <0.1 0.0 - 0.9 s/co ratio       Assessment and Plan      Diagnoses and all orders for this visit:    1. Type 2 diabetes mellitus with diabetic autonomic neuropathy, without long-term current use of insulin (HCC) (Primary)  Assessment & Plan:  Diabetes is improving with treatment.   Continue current treatment regimen.  Reminded to bring in blood sugar diary at next visit.  Dietary recommendations for ADA diet.  Regular aerobic exercise.  Discussed ways to avoid symptomatic hypoglycemia.  Discussed sick day management.  Discussed foot care.  Diabetes will be reassessed in 6 months.      2. Essential hypertension  Assessment & Plan:  Hypertension is improving with treatment.  Continue current treatment regimen.  Dietary sodium restriction.  Weight loss.  Regular aerobic exercise.  Blood pressure will be reassessed at the next regular appointment.      3. Mixed hyperlipidemia  Assessment & Plan:  Lipid abnormalities are improving with treatment.  Nutritional counseling was provided. and Pharmacotherapy as ordered.  Lipids will be reassessed in 6 months.    Orders:  -     atorvastatin (LIPITOR) 20 MG tablet; Take 1 tablet by mouth Daily.  Dispense: 90 tablet; Refill: 1    4. Postablative hypothyroidism  Assessment & Plan:  Symptoms are stable.  Labs have been reviewed.             Follow Up   No follow-ups on file.  Patient was given instructions and counseling regarding her condition or for health maintenance advice. Please see specific information pulled into the AVS if appropriate.

## 2021-10-15 NOTE — ASSESSMENT & PLAN NOTE
Diabetes is improving with treatment.   Continue current treatment regimen.  Reminded to bring in blood sugar diary at next visit.  Dietary recommendations for ADA diet.  Regular aerobic exercise.  Discussed ways to avoid symptomatic hypoglycemia.  Discussed sick day management.  Discussed foot care.  Diabetes will be reassessed in 6 months.

## 2022-03-07 ENCOUNTER — TELEPHONE (OUTPATIENT)
Dept: FAMILY MEDICINE CLINIC | Facility: CLINIC | Age: 42
End: 2022-03-07

## 2022-03-07 NOTE — TELEPHONE ENCOUNTER
Caller: SHONNA MCDANIEL  Relationship:   Best call back number: [phone]    Who is your current provider: DR. ANDERSON    Who would you like your new provider to be: WHOM EVER DR. ANDERSON THINKS THAT WOULD BE BEST FOR HIS WIFE AND HIM?    What are your reasons for transferring care: PROVIDER LEAVING THE PRACTICE    Additional notes: PLEASE CONTACT PATIENT TO ADVISE AND THEY WOULD BE INTERESTED IN ONE OF THE NEW PHYSICIANS THAT ARRIVE THIS FALL.

## 2022-03-30 DIAGNOSIS — E78.2 MIXED HYPERLIPIDEMIA: ICD-10-CM

## 2022-03-30 RX ORDER — ATORVASTATIN CALCIUM 20 MG/1
TABLET, FILM COATED ORAL
Qty: 90 TABLET | Refills: 0 | Status: SHIPPED | OUTPATIENT
Start: 2022-03-30 | End: 2022-10-13 | Stop reason: DRUGHIGH

## 2022-07-14 DIAGNOSIS — E78.2 MIXED HYPERLIPIDEMIA: ICD-10-CM

## 2022-07-15 RX ORDER — ATORVASTATIN CALCIUM 20 MG/1
TABLET, FILM COATED ORAL
Qty: 90 TABLET | Refills: 0 | OUTPATIENT
Start: 2022-07-15

## 2022-10-10 ENCOUNTER — OFFICE VISIT (OUTPATIENT)
Dept: FAMILY MEDICINE CLINIC | Facility: CLINIC | Age: 42
End: 2022-10-10

## 2022-10-10 VITALS
TEMPERATURE: 98 F | SYSTOLIC BLOOD PRESSURE: 130 MMHG | HEIGHT: 63 IN | OXYGEN SATURATION: 100 % | DIASTOLIC BLOOD PRESSURE: 66 MMHG | RESPIRATION RATE: 16 BRPM | BODY MASS INDEX: 29.59 KG/M2 | WEIGHT: 167 LBS | HEART RATE: 111 BPM

## 2022-10-10 DIAGNOSIS — I10 ESSENTIAL HYPERTENSION: Primary | ICD-10-CM

## 2022-10-10 DIAGNOSIS — E88.81 INSULIN RESISTANCE: ICD-10-CM

## 2022-10-10 DIAGNOSIS — E78.2 MIXED HYPERLIPIDEMIA: ICD-10-CM

## 2022-10-10 DIAGNOSIS — T88.7XXA DRUG SIDE EFFECTS: ICD-10-CM

## 2022-10-10 DIAGNOSIS — E66.3 OVERWEIGHT WITH BODY MASS INDEX (BMI) OF 29 TO 29.9 IN ADULT: ICD-10-CM

## 2022-10-10 PROCEDURE — 99214 OFFICE O/P EST MOD 30 MIN: CPT | Performed by: STUDENT IN AN ORGANIZED HEALTH CARE EDUCATION/TRAINING PROGRAM

## 2022-10-10 NOTE — ASSESSMENT & PLAN NOTE
Patient's (Body mass index is 29.58 kg/m².) indicates that they are overweight with health conditions that include hypertension, diabetes mellitus and dyslipidemias . Weight is unchanged. BMI is is above average; BMI management plan is completed. We discussed portion control and increasing exercise.

## 2022-10-10 NOTE — PROGRESS NOTES
Subjective   Debbie Deluca is a 42 y.o. female.   Chief Complaint   Patient presents with   • Establish Care     Pt transferring from Dr. Howie Klein   • Diabetes   • Hypertension       History of Present Illness       Hypertension  -Current medications (Amlodipine 5 mg daily, HCTZ 12.5 mg daily) Side effects: Denies lower extremity swelling or dizziness  -CAD risk factor:  (DM, Hyperlipidemia, Family history)  -Last  blood pressure readin/80   10/11/2021  -BP today is 130/66    Hypothyroidism  -Patient sees Dr. Yasmine Santo (endocrinology)  -Patient states she previously had numerous issues with her thyroid and her endocrinologist felt it was best to have it removed  -Endocrinology is managing this issue    Elevated A1c  -Current medications (Metformin  mg daily) Side effects: none  -Patient was documented to have diabetes but was sent to endocrinology for management.  Per endocrinologist, patient states that she does not have diabetes but has a condition where her body creates insulin but it rejects it (patient unsure of the name of the condition)  -Endocrinology is managing this issue  -Last A1c was 5.5 on 10/4/2021 and 5.6 on 2021    Abnormal menstrual cycle  -Patient takes listed progesterone for abnormal menstrual cycles.  Works well  -Initially she took it to possibly help her become pregnant but she and her  are no longer as focused on this      The following portions of the patient's history were reviewed and updated as appropriate: allergies, current medications, past family history, past medical history, past social history, past surgical history and problem list.    Patient Active Problem List   Diagnosis   • Essential hypertension   • Postablative hypothyroidism   • Mixed hyperlipidemia   • Abnormal menstrual cycle   • Overweight with body mass index (BMI) of 29 to 29.9 in adult       Current Outpatient Medications on File Prior to Visit   Medication Sig Dispense Refill   •  "amLODIPine (NORVASC) 5 MG tablet Take 5 mg by mouth Daily.     • Euthyrox 100 MCG tablet      • hydroCHLOROthiazide (HYDRODIURIL) 12.5 MG tablet      • metFORMIN ER (GLUCOPHAGE-XR) 500 MG 24 hr tablet      • Progesterone (PROMETRIUM) 200 MG capsule Take 1 capsule by mouth Daily. 20 capsule 3   • atorvastatin (LIPITOR) 20 MG tablet Take 1 tablet by mouth once daily 90 tablet 0     No current facility-administered medications on file prior to visit.     Current outpatient and discharge medications have been reconciled for the patient.  Reviewed by: Manisha Gaines DO      No Known Allergies      Objective   Visit Vitals  /66 (BP Location: Right arm, Patient Position: Sitting, Cuff Size: Large Adult)   Pulse 111   Temp 98 °F (36.7 °C) (Skin)   Resp 16   Ht 160 cm (63\")   Wt 75.8 kg (167 lb)   SpO2 100%   BMI 29.58 kg/m²       Physical Exam  HENT:      Head: Normocephalic.   Eyes:      Conjunctiva/sclera: Conjunctivae normal.   Cardiovascular:      Rate and Rhythm: Normal rate and regular rhythm.      Heart sounds: Normal heart sounds.   Pulmonary:      Effort: Pulmonary effort is normal. No respiratory distress.      Breath sounds: Normal breath sounds. No wheezing, rhonchi or rales.   Neurological:      Mental Status: She is alert.           Diagnoses and all orders for this visit:    1. Essential hypertension (Primary)  -Patient is doing well on hydrochlorothiazide 12.5 mg and amlodipine 5 mg, continue current regimen    2. Mixed hyperlipidemia  -Patient currently on atorvastatin 20 mg  -CMP to monitor LFTs and reordered lipid panel to monitor cholesterol    3. Insulin resistance  -Asked patient to fill out a transfer form to allow Dr. Santo's office at [facility] to send patient's documentation over to us so we can update her chart    6. Overweight with body mass index (BMI) of 29 to 29.9 in adult  Assessment & Plan:  Patient's (Body mass index is 29.58 kg/m².) indicates that they are overweight with health " conditions that include hypertension, diabetes mellitus and dyslipidemias . Weight is unchanged. BMI is is above average; BMI management plan is completed. We discussed portion control and increasing exercise.          Follow Up  -2 months for physical exam    Expected course, medications, and adverse effects discussed as appropriate.  Call or return if worsening or persistent symptoms.  I wore protective equipment throughout this patient encounter to include mask and eye protection. Hand hygiene was performed before donning protective equipment and after removal when leaving the room.    This document is intended for medical expert use only. Reading of this document by patients and/or patient's family without participating medical staff guidance may result in misinterpretation and unintended morbidity. Any interpretation of such data is the responsibility of the patient and/or family member responsible for the patient in concert with their primary or specialist providers, not to be left for sources of online searches such as [employer] or similar queries. Relying on these approaches to knowledge may result in misinterpretation, misguided goals of care and even death should patients or family members try recommendations outside of the realm of professional medical care.

## 2022-10-11 LAB
ALBUMIN SERPL-MCNC: 5.2 G/DL (ref 3.8–4.8)
ALBUMIN/GLOB SERPL: 1.8 {RATIO} (ref 1.2–2.2)
ALP SERPL-CCNC: 65 IU/L (ref 44–121)
ALT SERPL-CCNC: 58 IU/L (ref 0–32)
AST SERPL-CCNC: 31 IU/L (ref 0–40)
BILIRUB SERPL-MCNC: 0.4 MG/DL (ref 0–1.2)
BUN SERPL-MCNC: 16 MG/DL (ref 6–24)
BUN/CREAT SERPL: 19 (ref 9–23)
CALCIUM SERPL-MCNC: 9.5 MG/DL (ref 8.7–10.2)
CHLORIDE SERPL-SCNC: 96 MMOL/L (ref 96–106)
CHOLEST SERPL-MCNC: 239 MG/DL (ref 100–199)
CO2 SERPL-SCNC: 24 MMOL/L (ref 20–29)
CREAT SERPL-MCNC: 0.83 MG/DL (ref 0.57–1)
EGFRCR SERPLBLD CKD-EPI 2021: 91 ML/MIN/1.73
GLOBULIN SER CALC-MCNC: 2.9 G/DL (ref 1.5–4.5)
GLUCOSE SERPL-MCNC: 98 MG/DL (ref 70–99)
HDLC SERPL-MCNC: 40 MG/DL
LDLC SERPL CALC-MCNC: 148 MG/DL (ref 0–99)
POTASSIUM SERPL-SCNC: 4.3 MMOL/L (ref 3.5–5.2)
PROT SERPL-MCNC: 8.1 G/DL (ref 6–8.5)
SODIUM SERPL-SCNC: 137 MMOL/L (ref 134–144)
TRIGL SERPL-MCNC: 277 MG/DL (ref 0–149)
VLDLC SERPL CALC-MCNC: 51 MG/DL (ref 5–40)

## 2022-10-13 ENCOUNTER — PATIENT ROUNDING (BHMG ONLY) (OUTPATIENT)
Dept: FAMILY MEDICINE CLINIC | Facility: CLINIC | Age: 42
End: 2022-10-13

## 2022-10-13 ENCOUNTER — TELEPHONE (OUTPATIENT)
Dept: FAMILY MEDICINE CLINIC | Facility: CLINIC | Age: 42
End: 2022-10-13

## 2022-10-13 DIAGNOSIS — E78.2 MIXED HYPERLIPIDEMIA: Primary | ICD-10-CM

## 2022-10-13 RX ORDER — ATORVASTATIN CALCIUM 40 MG/1
40 TABLET, FILM COATED ORAL DAILY
Qty: 90 TABLET | Refills: 3 | Status: SHIPPED | OUTPATIENT
Start: 2022-10-13

## 2022-10-13 NOTE — PROGRESS NOTES
October 13, 2022    Voicemail left. Hello, My name is Yovana Sanchez, and I am calling your from    [facility], [facility]  I wanted to welcome you to our practice and ensure that your first visit went smoothly. If you have any question or concerns, feel free to reach out to me directly at [phone]. Thank you and have a great day!

## 2022-10-13 NOTE — TELEPHONE ENCOUNTER
Called and spoke to her . He gave me her number to call her. I left a voicemail. I am also sending a [employer] message.

## 2022-10-13 NOTE — TELEPHONE ENCOUNTER
Patient takes atorvastatin 20 mg, patient's lipid panel has worsened a little bit since last year (LDL of 148, total cholesterol of 239).  Increasing atorvastatin to 40 mg and we'll recheck in about 3 months

## 2022-12-12 ENCOUNTER — OFFICE VISIT (OUTPATIENT)
Dept: FAMILY MEDICINE CLINIC | Facility: CLINIC | Age: 42
End: 2022-12-12

## 2022-12-12 VITALS
DIASTOLIC BLOOD PRESSURE: 62 MMHG | TEMPERATURE: 97.3 F | WEIGHT: 169.2 LBS | RESPIRATION RATE: 16 BRPM | HEIGHT: 63 IN | OXYGEN SATURATION: 97 % | HEART RATE: 97 BPM | BODY MASS INDEX: 29.98 KG/M2 | SYSTOLIC BLOOD PRESSURE: 128 MMHG

## 2022-12-12 DIAGNOSIS — E66.3 OVERWEIGHT WITH BODY MASS INDEX (BMI) OF 29 TO 29.9 IN ADULT: ICD-10-CM

## 2022-12-12 DIAGNOSIS — E78.2 MIXED HYPERLIPIDEMIA: ICD-10-CM

## 2022-12-12 DIAGNOSIS — Z00.00 ANNUAL PHYSICAL EXAM: Primary | ICD-10-CM

## 2022-12-12 PROCEDURE — 99396 PREV VISIT EST AGE 40-64: CPT | Performed by: STUDENT IN AN ORGANIZED HEALTH CARE EDUCATION/TRAINING PROGRAM

## 2022-12-12 NOTE — ASSESSMENT & PLAN NOTE
Patient's (Body mass index is 29.97 kg/m².) indicates that they are overweight with health conditions that include hypertension and dyslipidemias . Weight is unchanged. BMI is is above average; BMI management plan is completed. We discussed portion control and increasing exercise.

## 2022-12-12 NOTE — PROGRESS NOTES
Chief Complaint   Patient presents with   • Annual Exam     Subjective   Debbie Deluca is a 42 y.o. female here for her annual physical with me.   Debbie is here for coordination of medical care, to discuss health maintenance, disease prevention as well as to followup on medical problems.     Annual Physical Exam  Patient's last PE was 04/30/2019.   Activity level is moderate.   Exercises 0 per week.   Appetite is good.   Feels well with few complaints.   Energy level is fair.   Sleeps well.   Patient's last colonoscopy was never  Patient's last mammogram was never   Patient's last pap smear was 2021.  She is HPV unknown. -Had pap smear done at Dr.Leigh Downing's office in [place] 1-2 years ago. Pt hasn't been to this OBGYN for a few years  Patient is doing routine self skin exam weekly.   Patient is doing routine self-breast exams never.  - Dr. Santo (endocrinology) checks thyroid and manages that and PCOS (takes metformin 500mg TID). Pt has never been diabetic or prediabetic      Patient has been erroneously marked as diabetic. Based on the available clinical information, she does not have diabetes and should therefore be excluded from diabetic health maintenance and quality measures for the remainder of the reporting period.      The following portions of the patient's history were reviewed and updated as appropriate: allergies, current medications, past family history, past medical history, past social history, past surgical history and problem list.      Past Medical History :  Active Ambulatory Problems     Diagnosis Date Noted   • Essential hypertension 01/03/2017   • Postablative hypothyroidism 05/27/2021   • Mixed hyperlipidemia 06/10/2021   • Abnormal menstrual cycle 06/10/2021   • Overweight with body mass index (BMI) of 29 to 29.9 in adult 10/10/2022     Resolved Ambulatory Problems     Diagnosis Date Noted   • No Resolved Ambulatory Problems     No Additional Past Medical History       Medication  List:    Current Outpatient Medications:   •  amLODIPine (NORVASC) 5 MG tablet, Take 5 mg by mouth Daily., Disp: , Rfl:   •  atorvastatin (LIPITOR) 40 MG tablet, Take 1 tablet by mouth Daily., Disp: 90 tablet, Rfl: 3  •  Euthyrox 100 MCG tablet, , Disp: , Rfl:   •  hydroCHLOROthiazide (HYDRODIURIL) 12.5 MG tablet, , Disp: , Rfl:   •  metFORMIN ER (GLUCOPHAGE-XR) 500 MG 24 hr tablet, , Disp: , Rfl:   •  Progesterone (PROMETRIUM) 200 MG capsule, Take 1 capsule by mouth Daily., Disp: 20 capsule, Rfl: 3    Allergies:  No Known Allergies    Social History:  Social History     Socioeconomic History   • Marital status:    Tobacco Use   • Smoking status: Never   • Smokeless tobacco: Never   Vaping Use   • Vaping Use: Never used   Substance and Sexual Activity   • Alcohol use: Never   • Drug use: Never   • Sexual activity: Yes     Partners: Male     Birth control/protection: None     Comment: monogamous with        Family History:  Family History   Problem Relation Age of Onset   • Colon cancer Mother         dx at age late 68, living at 70   • Miscarriages / Stillbirths Mother    • Prostate cancer Father    • Other Sister         Downd Syndrom   • Other Sister         MS-dx at age 43       Past Surgical History:  Past Surgical History:   Procedure Laterality Date   • FOOT SURGERY Right 2011    broken foot repair   • THYROIDECTOMY  2019   • US GUIDED FINE NEEDLE ASPIRATION  07/24/2019    for thyroid       PHQ-2 Depression Screening  Little interest or pleasure in doing things?     Feeling down, depressed, or hopeless?     PHQ-2 Total Score       Health Maintenance   Topic Date Due   • Hepatitis B (1 of 3 - 3-dose series) Never done   • URINE MICROALBUMIN  Never done   • Pneumococcal Vaccine 0-64 (1 - PCV) Never done   • TDAP/TD VACCINES (1 - Tdap) Never done   • DIABETIC FOOT EXAM  Never done   • PAP SMEAR  Never done   • DIABETIC EYE EXAM  Never done   • COVID-19 Vaccine (1) 12/14/2022 (Originally 1/10/1981)  "  • INFLUENZA VACCINE  03/31/2023 (Originally 8/1/2022)   • HEMOGLOBIN A1C  05/01/2023   • LIPID PANEL  11/01/2023   • HEPATITIS C SCREENING  Completed         There is no immunization history on file for this patient.          Physical Exam:  Vital Signs:  /62 (BP Location: Left arm, Patient Position: Sitting, Cuff Size: Adult)   Pulse 97   Temp 97.3 °F (36.3 °C) (Skin)   Resp 16   Ht 160 cm (63\")   Wt 76.7 kg (169 lb 3.2 oz)   LMP 10/23/2022 (Approximate)   SpO2 97%   BMI 29.97 kg/m²     Physical Exam  Constitutional:       General: She is not in acute distress.     Appearance: Normal appearance.   HENT:      Head: Normocephalic and atraumatic.      Right Ear: Tympanic membrane normal.      Left Ear: Tympanic membrane normal.      Nose: Nose normal.      Mouth/Throat:      Mouth: Mucous membranes are moist.      Pharynx: Oropharynx is clear. No posterior oropharyngeal erythema.   Eyes:      Extraocular Movements: Extraocular movements intact.      Conjunctiva/sclera: Conjunctivae normal.   Neck:      Comments: - Thyroid not enlarged  Cardiovascular:      Rate and Rhythm: Normal rate and regular rhythm.      Heart sounds: Normal heart sounds.   Pulmonary:      Effort: Pulmonary effort is normal.      Breath sounds: Normal breath sounds. No stridor. No wheezing.   Abdominal:      General: Abdomen is flat. Bowel sounds are normal.      Palpations: Abdomen is soft. There is no mass.      Tenderness: There is no abdominal tenderness. There is no guarding.   Musculoskeletal:         General: No swelling or deformity. Normal range of motion.      Cervical back: Normal range of motion and neck supple.   Skin:     General: Skin is warm and dry.      Capillary Refill: Capillary refill takes less than 2 seconds.      Coloration: Skin is not jaundiced.      Findings: No rash.   Neurological:      General: No focal deficit present.      Mental Status: She is alert and oriented to person, place, and time.      " Cranial Nerves: No cranial nerve deficit.      Motor: No weakness.      Coordination: Coordination normal.      Gait: Gait normal.      Deep Tendon Reflexes: Reflexes normal.   Psychiatric:         Mood and Affect: Mood normal.         Behavior: Behavior normal.         Thought Content: Thought content normal.           Assessment and Plan:  Diagnoses and all orders for this visit:    1. Annual physical exam (Primary)  - normal 42 yr old female exam  - pt filled out a transfer of information form to Dr. Maria Fernanda Downing's office for pap smear results  - discussed with pt to call her insurance about where to get her tetanus booster the cheapest    2. Mixed hyperlipidemia  -     Lipid Panel With / Chol / HDL Ratio to check cholesterol as I had increased atorvastatin from 20mg to 40mg    3. Overweight with body mass index (BMI) of 29 to 29.9 in adult  Assessment & Plan:  Patient's (Body mass index is 29.97 kg/m².) indicates that they are overweight with health conditions that include hypertension and dyslipidemias . Weight is unchanged. BMI is is above average; BMI management plan is completed. We discussed portion control and increasing exercise.     Follow up  - 1 year for annual physical exam      Discussed screening for common diseases.  Discussed timing of cervical cancer screening with pap smear and HPV testing. Encouraged self-breast exams, clinical breast exams, and discussed timing of mammograms.  Recommend yearly eye exams. Also discussed monitoring of blood pressure, lipids, blood sugars.        I wore protective equipment throughout this patient encounter to include mask and eye protection. Hand hygiene was performed before donning protective equipment and after removal when leaving the room.

## 2022-12-13 ENCOUNTER — TELEPHONE (OUTPATIENT)
Dept: FAMILY MEDICINE CLINIC | Facility: CLINIC | Age: 42
End: 2022-12-13

## 2022-12-13 LAB
CHOLEST SERPL-MCNC: 146 MG/DL (ref 100–199)
CHOLEST/HDLC SERPL: 4.1 RATIO (ref 0–4.4)
HDLC SERPL-MCNC: 36 MG/DL
LDLC SERPL CALC-MCNC: 86 MG/DL (ref 0–99)
TRIGL SERPL-MCNC: 137 MG/DL (ref 0–149)
VLDLC SERPL CALC-MCNC: 24 MG/DL (ref 5–40)

## 2022-12-13 NOTE — TELEPHONE ENCOUNTER
----- Message from Manisha Gaines DO sent at 12/13/2022  8:28 AM EST -----  Please let patient know that her cholesterol is much improved than what it was 2 months ago.  We will keep the same dose of atorvastatin as long as she is tolerating it

## 2022-12-20 ENCOUNTER — TELEPHONE (OUTPATIENT)
Dept: FAMILY MEDICINE CLINIC | Facility: CLINIC | Age: 42
End: 2022-12-20

## 2022-12-20 NOTE — TELEPHONE ENCOUNTER
ANASTACIO 12/20/2022, 09:01 am advised pt per Dr. Gaines we received her records, Pap is due.  She can do pap at next PE or schedule sooner

## 2022-12-20 NOTE — TELEPHONE ENCOUNTER
----- Message from Manisha Gaines DO sent at 12/15/2022  9:24 AM EST -----  Regarding: pap  Please let patient know that I received her records from her OB/GYN and it looks like she is due for a Pap

## 2023-01-23 ENCOUNTER — TELEPHONE (OUTPATIENT)
Dept: FAMILY MEDICINE CLINIC | Facility: CLINIC | Age: 43
End: 2023-01-23
Payer: COMMERCIAL

## 2023-01-23 NOTE — TELEPHONE ENCOUNTER
Received records from patient's endocrinologist.  Highlights include:     - patient's TPO was found to be high  - Patient saw Dr. Y[facility] ([facility]) as a 18-year-old for a menorrhea and striae on physical exam.    - She found elevations of testosterone, DHEA-S level, CT scan showed multiple cysts in the ovary.    - Patient was started on different types of birth control pills and Glucophage to see if she could discontinue birth control pills and resume a normal menstrual cycle  -Patient has a history of papillary thyroid cancer and patient is status post surgery for Graves' disease in 2019  (has post ablative hypothyroidism)  - Surgical pathology report from [facility] on 10/15/2019 showed minute focus of papillary thyroid carcinoma on right lobe of thyroid and left lobe showed papillary thyroid carcinoma conventional type with 2 perithyroidal lymph nodes negative for metastatic carcinoma.

## 2023-01-24 ENCOUNTER — TELEPHONE (OUTPATIENT)
Dept: FAMILY MEDICINE CLINIC | Facility: CLINIC | Age: 43
End: 2023-01-24
Payer: COMMERCIAL

## 2023-01-24 DIAGNOSIS — E78.2 MIXED HYPERLIPIDEMIA: Primary | ICD-10-CM

## 2023-01-24 NOTE — TELEPHONE ENCOUNTER
Increase atorvastatin from 20 mg to 40 mg due to lipid panel results.  Rechecking cholesterol level

## 2023-02-07 LAB
CHOLEST SERPL-MCNC: 141 MG/DL (ref 100–199)
CHOLEST/HDLC SERPL: 3.8 RATIO (ref 0–4.4)
HDLC SERPL-MCNC: 37 MG/DL
LDLC SERPL CALC-MCNC: 81 MG/DL (ref 0–99)
TRIGL SERPL-MCNC: 128 MG/DL (ref 0–149)
VLDLC SERPL CALC-MCNC: 23 MG/DL (ref 5–40)

## 2023-02-09 ENCOUNTER — TELEPHONE (OUTPATIENT)
Dept: FAMILY MEDICINE CLINIC | Facility: CLINIC | Age: 43
End: 2023-02-09
Payer: COMMERCIAL

## 2023-02-09 NOTE — TELEPHONE ENCOUNTER
Spoke to pt's  (confirmed by verbal release form) advised of lab results per Dr. Gaines and instructions.

## 2023-02-09 NOTE — TELEPHONE ENCOUNTER
----- Message from Manisha Gaines DO sent at 2/9/2023  8:12 AM EST -----  Patient's cholesterol is about the same as it was 1 month ago but is slightly better.  HDL came up one-point, regular physical activity and eating fish a few times a week can be helpful for that

## 2023-12-18 ENCOUNTER — OFFICE VISIT (OUTPATIENT)
Dept: FAMILY MEDICINE CLINIC | Facility: CLINIC | Age: 43
End: 2023-12-18
Payer: COMMERCIAL

## 2023-12-18 VITALS
RESPIRATION RATE: 16 BRPM | HEIGHT: 63 IN | HEART RATE: 94 BPM | OXYGEN SATURATION: 98 % | DIASTOLIC BLOOD PRESSURE: 60 MMHG | TEMPERATURE: 98.4 F | BODY MASS INDEX: 31.61 KG/M2 | WEIGHT: 178.4 LBS | SYSTOLIC BLOOD PRESSURE: 118 MMHG

## 2023-12-18 DIAGNOSIS — T88.7XXA DRUG SIDE EFFECTS: ICD-10-CM

## 2023-12-18 DIAGNOSIS — Z12.4 ROUTINE PAPANICOLAOU SMEAR: ICD-10-CM

## 2023-12-18 DIAGNOSIS — E78.2 MIXED HYPERLIPIDEMIA: ICD-10-CM

## 2023-12-18 DIAGNOSIS — Z00.00 ANNUAL PHYSICAL EXAM: Primary | ICD-10-CM

## 2023-12-18 PROBLEM — R73.03 PREDIABETES: Status: ACTIVE | Noted: 2023-12-18

## 2023-12-18 RX ORDER — LEVOTHYROXINE SODIUM 112 UG/1
112 TABLET ORAL DAILY
COMMUNITY

## 2023-12-18 RX ORDER — BETAMETHASONE DIPROPIONATE 0.5 MG/G
CREAM TOPICAL
COMMUNITY
Start: 2023-08-26

## 2023-12-18 NOTE — PROGRESS NOTES
Chief Complaint  Chief Complaint   Patient presents with    Annual Exam       Subjective    History of Present Illness        Debbie Deluca presents to [facility] for   Debbie Deluca is a 43 y.o. female here for her annual physical with me. Debbie is here for coordination of medical care, to discuss health maintenance, disease prevention as well as to followup on medical problems.     Annual Physical Exam  Patient's last Physical Exam was 12/12/2022. Patient's medical history, medications and allergy lists were reviewed and updated.  Activity level is minimal.   Exercises 0 per week.   Appetite is good.   Feels fairly well to well with few complaints.   Energy level is good and fair.   Sleeps well.   Patient's last colonoscopy was never.   Patient's last mammogram was never.   Patient is doing routine self skin exam occasionally.   Patient is doing routine self-breast exams  never  Patient's menstrual cycles are:  Regular  Last pap smear was done:  2 years ago with Dr. Jordan velasquez (Will obtain records)  -Offered influenza and Tdap vaccines, but pt declines  -Offered colonoscopy and Cologuard, but pt declines      Family History   Problem Relation Age of Onset    Colon cancer Mother         dx at age late 68, living at 70    Miscarriages / Stillbirths Mother     Prostate cancer Father     Other Sister         Downs syndrome    Other Sister         MS       Social History     Tobacco Use    Smoking status: Never    Smokeless tobacco: Never   Vaping Use    Vaping Use: Never used   Substance Use Topics    Alcohol use: Never    Drug use: Never       Past Surgical History:   Procedure Laterality Date    FOOT SURGERY Right 2011    broken foot repair    THYROIDECTOMY  2019    US GUIDED FINE NEEDLE ASPIRATION  07/24/2019    for thyroid       Patient Active Problem List   Diagnosis    Essential hypertension    Postablative hypothyroidism    Mixed hyperlipidemia    Abnormal menstrual cycle     "Overweight with body mass index (BMI) of 29 to 29.9 in adult         Current Outpatient Medications:     amLODIPine (NORVASC) 5 MG tablet, Take 1 tablet by mouth Daily., Disp: , Rfl:     atorvastatin (LIPITOR) 40 MG tablet, Take 1 tablet by mouth Daily., Disp: 90 tablet, Rfl: 3    betamethasone dipropionate 0.05 % cream, , Disp: , Rfl:     Euthyrox 100 MCG tablet, , Disp: , Rfl:     hydroCHLOROthiazide (HYDRODIURIL) 12.5 MG tablet, , Disp: , Rfl:     levothyroxine (SYNTHROID, LEVOTHROID) 112 MCG tablet, Take 1 tablet by mouth Daily., Disp: , Rfl:     metFORMIN ER (GLUCOPHAGE-XR) 500 MG 24 hr tablet, , Disp: , Rfl:     Progesterone (PROMETRIUM) 200 MG capsule, Take 1 capsule by mouth Daily., Disp: 20 capsule, Rfl: 3    Objective   /60 (BP Location: Left arm, Patient Position: Sitting, Cuff Size: Adult)   Pulse 94   Temp 98.4 °F (36.9 °C) (Skin)   Resp 16   Ht 160 cm (63\")   Wt 80.9 kg (178 lb 6.4 oz)   LMP 11/19/2023 (Approximate)   SpO2 98%   BMI 31.60 kg/m²   BP Readings from Last 3 Encounters:   12/18/23 118/60   06/26/23 147/99   12/12/22 128/62     Wt Readings from Last 3 Encounters:   12/18/23 80.9 kg (178 lb 6.4 oz)   06/26/23 74.8 kg (165 lb)   12/12/22 76.7 kg (169 lb 3.2 oz)     Physical Exam  Constitutional:       General: She is not in acute distress.  HENT:      Head: Normocephalic and atraumatic.      Right Ear: Tympanic membrane normal.      Left Ear: Tympanic membrane normal.      Nose: Nose normal.      Mouth/Throat:      Mouth: Mucous membranes are moist.      Pharynx: Oropharynx is clear. No posterior oropharyngeal erythema.   Eyes:      Extraocular Movements: Extraocular movements intact.      Conjunctiva/sclera: Conjunctivae normal.   Neck:      Comments: - Thyroid not enlarged  Cardiovascular:      Rate and Rhythm: Normal rate and regular rhythm.      Heart sounds: Normal heart sounds.   Pulmonary:      Effort: Pulmonary effort is normal.      Breath sounds: Normal breath sounds. " No stridor. No wheezing.   Abdominal:      General: Abdomen is flat. Bowel sounds are normal.      Palpations: Abdomen is soft. There is no mass.      Tenderness: There is no abdominal tenderness. There is no guarding.   Genitourinary:     General: Normal vulva.      Comments: - Normal bimanual exam  -Cervix appearance was normal  Musculoskeletal:         General: No swelling or deformity. Normal range of motion.      Cervical back: Normal range of motion and neck supple.   Skin:     General: Skin is warm and dry.      Capillary Refill: Capillary refill takes less than 2 seconds.      Coloration: Skin is not jaundiced.      Findings: No rash.   Neurological:      General: No focal deficit present.      Mental Status: She is alert and oriented to person, place, and time.      Cranial Nerves: No cranial nerve deficit.      Motor: No weakness.      Coordination: Coordination normal.      Gait: Gait normal.      Deep Tendon Reflexes: Reflexes normal.   Psychiatric:         Mood and Affect: Mood normal.         Behavior: Behavior normal.         Thought Content: Thought content normal.         Assessment and Plan   Diagnoses and all orders for this visit:    1. Annual physical exam (Primary)  -Normal 43-year-old female exam  -Patient's endocrinologist (Dr. Yasmine Santo)  Is watching patient's hemoglobin A1c (was 5.9 on 11/30/2023 and 5.4 on 11/1/2022) and managing patient's thyroid  -Other pertinent screening labs ordered per below    2. Mixed hyperlipidemia  -     Lipid panel  -     Comprehensive metabolic panel    3. Drug side effects  -     CBC & Differential    4. Routine Papanicolaou smear  -     Pap IG, HPV-hr    5. BMI 31.0-31.9,adult  -Information about calorie counting and standardized exercise recommendations added to patient's after visit summary           Follow Up   - 1 year for annual physical exam      The patient was counseled regarding nutrition, physical activity, healthy weight, injury prevention,  immunizations and preventative health screenings. Expected course, medications, and adverse effects discussed.  Call or return if worsening or persistent symptoms.  I wore protective equipment throughout this patient encounter including a mask and eye protection.   The complete contents of the Assessment and Plan and Data / Lab Results as documented above have been reviewed and addressed by myself with the patient today as part of an ongoing evaluation / treatment plan.

## 2023-12-19 LAB
ALBUMIN SERPL-MCNC: 4.9 G/DL (ref 3.9–4.9)
ALBUMIN/GLOB SERPL: 1.6 {RATIO} (ref 1.2–2.2)
ALP SERPL-CCNC: 70 IU/L (ref 44–121)
ALT SERPL-CCNC: 74 IU/L (ref 0–32)
AST SERPL-CCNC: 35 IU/L (ref 0–40)
BASOPHILS # BLD AUTO: 0.1 X10E3/UL (ref 0–0.2)
BASOPHILS NFR BLD AUTO: 1 %
BILIRUB SERPL-MCNC: 0.6 MG/DL (ref 0–1.2)
BUN SERPL-MCNC: 15 MG/DL (ref 6–24)
BUN/CREAT SERPL: 18 (ref 9–23)
CALCIUM SERPL-MCNC: 9.5 MG/DL (ref 8.7–10.2)
CHLORIDE SERPL-SCNC: 96 MMOL/L (ref 96–106)
CHOLEST SERPL-MCNC: 143 MG/DL (ref 100–199)
CO2 SERPL-SCNC: 26 MMOL/L (ref 20–29)
CREAT SERPL-MCNC: 0.83 MG/DL (ref 0.57–1)
EGFRCR SERPLBLD CKD-EPI 2021: 90 ML/MIN/1.73
EOSINOPHIL # BLD AUTO: 0.1 X10E3/UL (ref 0–0.4)
EOSINOPHIL NFR BLD AUTO: 1 %
ERYTHROCYTE [DISTWIDTH] IN BLOOD BY AUTOMATED COUNT: 13.4 % (ref 11.7–15.4)
GLOBULIN SER CALC-MCNC: 3 G/DL (ref 1.5–4.5)
GLUCOSE SERPL-MCNC: 104 MG/DL (ref 70–99)
HCT VFR BLD AUTO: 43.2 % (ref 34–46.6)
HDLC SERPL-MCNC: 42 MG/DL
HGB BLD-MCNC: 14.6 G/DL (ref 11.1–15.9)
IMM GRANULOCYTES # BLD AUTO: 0 X10E3/UL (ref 0–0.1)
IMM GRANULOCYTES NFR BLD AUTO: 0 %
LDLC SERPL CALC-MCNC: 75 MG/DL (ref 0–99)
LYMPHOCYTES # BLD AUTO: 2 X10E3/UL (ref 0.7–3.1)
LYMPHOCYTES NFR BLD AUTO: 20 %
MCH RBC QN AUTO: 29.1 PG (ref 26.6–33)
MCHC RBC AUTO-ENTMCNC: 33.8 G/DL (ref 31.5–35.7)
MCV RBC AUTO: 86 FL (ref 79–97)
MONOCYTES # BLD AUTO: 0.5 X10E3/UL (ref 0.1–0.9)
MONOCYTES NFR BLD AUTO: 5 %
NEUTROPHILS # BLD AUTO: 7.2 X10E3/UL (ref 1.4–7)
NEUTROPHILS NFR BLD AUTO: 73 %
PLATELET # BLD AUTO: 415 X10E3/UL (ref 150–450)
POTASSIUM SERPL-SCNC: 4 MMOL/L (ref 3.5–5.2)
PROT SERPL-MCNC: 7.9 G/DL (ref 6–8.5)
RBC # BLD AUTO: 5.02 X10E6/UL (ref 3.77–5.28)
SODIUM SERPL-SCNC: 139 MMOL/L (ref 134–144)
TRIGL SERPL-MCNC: 149 MG/DL (ref 0–149)
VLDLC SERPL CALC-MCNC: 26 MG/DL (ref 5–40)
WBC # BLD AUTO: 10 X10E3/UL (ref 3.4–10.8)

## 2023-12-21 ENCOUNTER — TELEPHONE (OUTPATIENT)
Dept: FAMILY MEDICINE CLINIC | Facility: CLINIC | Age: 43
End: 2023-12-21
Payer: COMMERCIAL

## 2023-12-21 LAB
CYTOLOGIST CVX/VAG CYTO: NORMAL
CYTOLOGY CVX/VAG DOC CYTO: NORMAL
CYTOLOGY CVX/VAG DOC THIN PREP: NORMAL
DX ICD CODE: NORMAL
HIV 1 & 2 AB SER-IMP: NORMAL
HPV I/H RISK 4 DNA CVX QL PROBE+SIG AMP: NEGATIVE
OTHER STN SPEC: NORMAL
STAT OF ADQ CVX/VAG CYTO-IMP: NORMAL

## 2023-12-21 NOTE — TELEPHONE ENCOUNTER
----- Message from Amie Yepez MA sent at 12/21/2023  8:41 AM EST -----  Regarding: FW: lab results  Contact: [phone]    ----- Message -----  From: Debbie Deluca  Sent: 12/19/2023   6:52 PM EST  To: Amie Yepez MA  Subject: lab results                                      Would it be ok to try and work on exercise and dieting for about 6 months and see where that gets us? Is there any major concerns based on other parts of lab results that would warrant more immediate lab work instead?

## 2023-12-21 NOTE — TELEPHONE ENCOUNTER
Her cholesterol actually is very good, the ALT could potentially be from a fatty liver or could be from something else.  The additional labs would include checking her ferritin, iron, checking for hepatitis B and C and we would order an ultrasound to have a look at the liver.  If she just wants to wait and recheck in 6 months and then do the lab workup, we can do that.  Is that what she is asking?

## 2023-12-22 ENCOUNTER — TELEPHONE (OUTPATIENT)
Dept: FAMILY MEDICINE CLINIC | Facility: CLINIC | Age: 43
End: 2023-12-22
Payer: COMMERCIAL

## 2023-12-22 NOTE — TELEPHONE ENCOUNTER
----- Message from Manisha Gaines DO sent at 12/22/2023  1:13 PM EST -----  Patient's Pap smear came back negative for any signs of malignancy and HPV negative

## 2023-12-22 NOTE — TELEPHONE ENCOUNTER
I called and spoke to patient's , Faustino. He states that the patient is okay with waiting 6 months and then doing the lab workup.

## 2023-12-22 NOTE — TELEPHONE ENCOUNTER
Okay, I think maybe something got missed clicked on the [employer] message.  Can we try calling her instead?    Her cholesterol actually is very good, the ALT could potentially be from a fatty liver or could be from something else.  The additional labs would include checking her ferritin, iron, checking for hepatitis B and C and we would order an ultrasound to have a look at the liver.  If she just wants to wait and recheck in 6 months and then do the lab workup, we can do that.  Is that what she is asking?

## 2024-12-17 NOTE — PROGRESS NOTES
Chief Complaint  Chief Complaint   Patient presents with    Annual Exam       Subjective    History of Present Illness        Debbie Deluca presents to [facility] for   Debbie Deluca is a 44 y.o. female here for her annual physical with me. Debbie is here for coordination of medical care, to discuss health maintenance, disease prevention as well as to followup on medical problems.     Annual Physical Exam  Patient's last Physical Exam was 12/18/2023. Patient's medical history, medications and allergy lists were reviewed and updated.  Activity level is minimal.   Exercises 0 per week.   Appetite is good.   Feels well with few complaints.   Energy level is good.   Sleeps fairly well.   Patient has never had a colonoscopy.  Patient has never had a mammogram.  Patient is doing routine self skin exam monthly.   Patient is doing routine self-breast exams monthly  Patient's menstrual cycles are: regular while on progesterone.  Last pap smear was done:  12/18/2023  Cytology and HPV negative  Influenza immunization was not given due to patient refusal.     - Progesterone allows pt to have period, have been trying to conceive      Itching eyes  -Patient states that she has 1 eye that will itch and she is not sure if she needs to be concerned        Family History   Problem Relation Age of Onset    Colon cancer Mother         dx at age late 68, living at 70    Miscarriages / Stillbirths Mother     Prostate cancer Father     Other Sister         Downs syndrome    Other Sister         MS       Social History     Tobacco Use    Smoking status: Never    Smokeless tobacco: Never   Vaping Use    Vaping status: Never Used   Substance Use Topics    Alcohol use: Never    Drug use: Never       Past Surgical History:   Procedure Laterality Date    FOOT SURGERY Right 2011    broken foot repair    THYROIDECTOMY  2019    US GUIDED FINE NEEDLE ASPIRATION  07/24/2019    for thyroid       Patient Active  "Problem List   Diagnosis    Essential hypertension    Postablative hypothyroidism    Mixed hyperlipidemia    Abnormal menstrual cycle    Overweight with body mass index (BMI) of 29 to 29.9 in adult    Prediabetes         Current Outpatient Medications:     amLODIPine (NORVASC) 5 MG tablet, Take 1 tablet by mouth Daily., Disp: , Rfl:     atorvastatin (LIPITOR) 40 MG tablet, Take 1 tablet by mouth Daily., Disp: 90 tablet, Rfl: 3    hydroCHLOROthiazide (HYDRODIURIL) 12.5 MG tablet, , Disp: , Rfl:     levothyroxine (SYNTHROID, LEVOTHROID) 112 MCG tablet, Take 1 tablet by mouth Daily., Disp: , Rfl:     metFORMIN ER (GLUCOPHAGE-XR) 500 MG 24 hr tablet, Take 1 tablet by mouth 3 (Three) Times a Day With Meals., Disp: , Rfl:     Progesterone (PROMETRIUM) 200 MG capsule, Take 1 capsule by mouth Daily., Disp: 20 capsule, Rfl: 3    olopatadine (PATANOL) 0.1 % ophthalmic solution, Administer 1 drop to both eyes 2 (Two) Times a Day., Disp: 10 mL, Rfl: 1    Objective   /82 (BP Location: Right arm, Patient Position: Sitting, Cuff Size: Large Adult)   Pulse 113   Temp 97.7 °F (36.5 °C) (Temporal)   Resp 18   Ht 157.5 cm (62\")   Wt 80.8 kg (178 lb 3.2 oz)   SpO2 93%   BMI 32.59 kg/m²   BP Readings from Last 3 Encounters:   12/19/24 126/82   12/18/23 118/60   06/26/23 147/99     Wt Readings from Last 3 Encounters:   12/19/24 80.8 kg (178 lb 3.2 oz)   12/18/23 80.9 kg (178 lb 6.4 oz)   06/26/23 74.8 kg (165 lb)     Physical Exam  Constitutional:       General: She is not in acute distress.  HENT:      Head: Normocephalic and atraumatic.      Right Ear: Tympanic membrane normal.      Left Ear: Tympanic membrane normal.      Nose: Nose normal.      Mouth/Throat:      Mouth: Mucous membranes are moist.      Pharynx: Oropharynx is clear. No posterior oropharyngeal erythema.   Eyes:      Extraocular Movements: Extraocular movements intact.      Conjunctiva/sclera: Conjunctivae normal.   Neck:      Comments: - Thyroid not " enlarged  Cardiovascular:      Rate and Rhythm: Normal rate and regular rhythm.      Heart sounds: Normal heart sounds.   Pulmonary:      Effort: Pulmonary effort is normal.      Breath sounds: Normal breath sounds. No stridor. No wheezing.   Abdominal:      General: Abdomen is flat. Bowel sounds are normal.      Palpations: Abdomen is soft. There is no mass.      Tenderness: There is no abdominal tenderness. There is no guarding.   Musculoskeletal:         General: No swelling or deformity. Normal range of motion.      Cervical back: Normal range of motion and neck supple.   Skin:     General: Skin is warm and dry.      Capillary Refill: Capillary refill takes less than 2 seconds.      Coloration: Skin is not jaundiced.      Findings: No rash.   Neurological:      General: No focal deficit present.      Mental Status: She is alert and oriented to person, place, and time.      Cranial Nerves: No cranial nerve deficit.      Motor: No weakness.      Coordination: Coordination normal.      Gait: Gait normal.      Deep Tendon Reflexes: Reflexes normal.   Psychiatric:         Mood and Affect: Mood normal.         Behavior: Behavior normal.         Thought Content: Thought content normal.             Assessment and Plan   Diagnoses and all orders for this visit:    1. Annual physical exam (Primary)  -Normal 44-year-old female exam  -Pertinent screening labs ordered per below.  Patient recently saw endocrinology who ordered CMP, A1c, TSH and T3    2. Allergic conjunctivitis, unspecified laterality  -Discussed with patient that if she has itching eyes, it could be an allergic reaction.  Recommended antihistamine eyedrops.  -Sent in olopatadine (PATANOL) 0.1 % ophthalmic solution; Administer 1 drop to both eyes 2 (Two) Times a Day.  Dispense: 10 mL; Refill: 1    3. Drug side effects  -     CBC & Differential  -     Vitamin B12    4. Mixed hyperlipidemia  -     Lipid panel    5. Screening mammogram for breast cancer  -      Mammo Screening Digital Tomosynthesis Bilateral With CAD    6. Class 1 obesity with body mass index (BMI) of 32.0 to 32.9 in adult, unspecified obesity type, unspecified whether serious comorbidity present  BMI is >= 30 and <35. (Class 1 Obesity). The following options were offered after discussion;: exercise counseling/recommendations and nutrition counseling/recommendations         Follow Up   - 1 year for annual physical exam      The patient was counseled regarding nutrition, physical activity, healthy weight, injury prevention, immunizations and preventative health screenings. Expected course, medications, and adverse effects discussed.  Call or return if worsening or persistent symptoms.  I wore protective equipment throughout this patient encounter including a mask and eye protection.   The complete contents of the Assessment and Plan and Data / Lab Results as documented above have been reviewed and addressed by myself with the patient today as part of an ongoing evaluation / treatment plan.

## 2024-12-19 ENCOUNTER — OFFICE VISIT (OUTPATIENT)
Dept: FAMILY MEDICINE CLINIC | Facility: CLINIC | Age: 44
End: 2024-12-19
Payer: COMMERCIAL

## 2024-12-19 VITALS
HEART RATE: 113 BPM | WEIGHT: 178.2 LBS | SYSTOLIC BLOOD PRESSURE: 126 MMHG | HEIGHT: 62 IN | DIASTOLIC BLOOD PRESSURE: 82 MMHG | RESPIRATION RATE: 18 BRPM | TEMPERATURE: 97.7 F | OXYGEN SATURATION: 93 % | BODY MASS INDEX: 32.79 KG/M2

## 2024-12-19 DIAGNOSIS — Z12.31 SCREENING MAMMOGRAM FOR BREAST CANCER: ICD-10-CM

## 2024-12-19 DIAGNOSIS — Z00.00 ANNUAL PHYSICAL EXAM: Primary | ICD-10-CM

## 2024-12-19 DIAGNOSIS — T88.7XXA DRUG SIDE EFFECTS: ICD-10-CM

## 2024-12-19 DIAGNOSIS — H10.10 ALLERGIC CONJUNCTIVITIS, UNSPECIFIED LATERALITY: ICD-10-CM

## 2024-12-19 DIAGNOSIS — E78.2 MIXED HYPERLIPIDEMIA: ICD-10-CM

## 2024-12-19 DIAGNOSIS — E66.811 CLASS 1 OBESITY WITH BODY MASS INDEX (BMI) OF 32.0 TO 32.9 IN ADULT, UNSPECIFIED OBESITY TYPE, UNSPECIFIED WHETHER SERIOUS COMORBIDITY PRESENT: ICD-10-CM

## 2024-12-19 RX ORDER — OLOPATADINE HYDROCHLORIDE 1 MG/ML
1 SOLUTION/ DROPS OPHTHALMIC 2 TIMES DAILY
Qty: 10 ML | Refills: 1 | Status: SHIPPED | OUTPATIENT
Start: 2024-12-19

## 2024-12-20 LAB
BASOPHILS # BLD AUTO: 0.1 X10E3/UL (ref 0–0.2)
BASOPHILS NFR BLD AUTO: 1 %
EOSINOPHIL # BLD AUTO: 0.2 X10E3/UL (ref 0–0.4)
EOSINOPHIL NFR BLD AUTO: 1 %
ERYTHROCYTE [DISTWIDTH] IN BLOOD BY AUTOMATED COUNT: 13.3 % (ref 11.7–15.4)
HCT VFR BLD AUTO: 44.5 % (ref 34–46.6)
HGB BLD-MCNC: 14.6 G/DL (ref 11.1–15.9)
IMM GRANULOCYTES # BLD AUTO: 0 X10E3/UL (ref 0–0.1)
IMM GRANULOCYTES NFR BLD AUTO: 0 %
LYMPHOCYTES # BLD AUTO: 2.2 X10E3/UL (ref 0.7–3.1)
LYMPHOCYTES NFR BLD AUTO: 20 %
MCH RBC QN AUTO: 28.2 PG (ref 26.6–33)
MCHC RBC AUTO-ENTMCNC: 32.8 G/DL (ref 31.5–35.7)
MCV RBC AUTO: 86 FL (ref 79–97)
MONOCYTES # BLD AUTO: 0.6 X10E3/UL (ref 0.1–0.9)
MONOCYTES NFR BLD AUTO: 6 %
NEUTROPHILS # BLD AUTO: 8.2 X10E3/UL (ref 1.4–7)
NEUTROPHILS NFR BLD AUTO: 72 %
PLATELET # BLD AUTO: 438 X10E3/UL (ref 150–450)
RBC # BLD AUTO: 5.17 X10E6/UL (ref 3.77–5.28)
WBC # BLD AUTO: 11.4 X10E3/UL (ref 3.4–10.8)

## 2024-12-21 LAB
CHOLEST SERPL-MCNC: 118 MG/DL (ref 100–199)
HDLC SERPL-MCNC: 37 MG/DL
LDLC SERPL CALC-MCNC: 63 MG/DL (ref 0–99)
TRIGL SERPL-MCNC: 91 MG/DL (ref 0–149)
VIT B12 SERPL-MCNC: 760 PG/ML (ref 232–1245)
VLDLC SERPL CALC-MCNC: 18 MG/DL (ref 5–40)